# Patient Record
(demographics unavailable — no encounter records)

---

## 2025-01-16 NOTE — HISTORY OF PRESENT ILLNESS
[de-identified] : 73 yo cough x 2 months Flew w/ URI and now w/HL bilat wears H Aids no other modifying factors no other nasal or throat complaints

## 2025-01-16 NOTE — ASSESSMENT
[FreeTextEntry1] : Mixed HL DNS and Rhinitis Rx   Steam humidification and hypertonic saline nasal irrigations  Rx- Flonase tx and medrol dospak f/u 7-10 days poss BMT

## 2025-01-16 NOTE — PROCEDURE
[de-identified] : Lam [de-identified] :  Anterior Rhinoscopy insufficient to account for symptoms.  After informed verbal consent is obtained, the fiberoptic nasal endoscope #3 is passed via the right nasal cavity. The following anatomic sites were directly examined in a sequential fashion: The scope was introduced in both  nasal passages between the middle and inferior turbinates to exam the inferior portion of the middle meatus and the fontanelle, as well as the maxillary ostia.  Next, the scope was passed medially and posteriorly to the middle turbinates to examine the sphenoethmoid recess and the superior turbinate region.   There is [ 0 ]% obstruction of the nasopharynx with adenoid tissue.  A deviated nasal septum was noted causing obstruction. The turbinates were congested-bilateral.  Right Side: 	Mucosa: wnl 	Mucous: none 	Polyp: none 	Inferior Turbinate: sl congested 	Middle Turbinate:sl congested 	Superior Turbinate: wnl 	Inferior Meatus:clear 	Middle Meatus: clear 	Super Meatus: clear 	Sphenoethmoidal Recess: wnl    Left Side: 	Mucosa: wnl 	Mucous:none 	Polyp: none 	Inferior Turbinate: sl congested 	Middle Turbinate: sl congested 	Superior Turbinate:wnl 	Inferior Meatus: clear 	Middle Meatus: clear 	Super Meatus:clear 	Sphenoethmoidal Recess: wnl

## 2025-01-16 NOTE — DATA REVIEWED
[de-identified] : Hearing Test performed to evaluate the extent of hearing loss and  to explain pt's symptoms  was personally reviewed and revealed Tymps-abn Hearing-bilat SNHL w/left CHL

## 2025-01-16 NOTE — PHYSICAL EXAM
[de-identified] : left wax [Nasal Endoscopy Performed] : nasal endoscopy was performed, see procedure section for findings [] : septum deviated to the left [de-identified] : congested [Midline] : trachea located in midline position [Normal] : no rashes

## 2025-01-23 NOTE — ASSESSMENT
[FreeTextEntry1] : Mixed HL-resolved Now w/ SNHL bilateral sensorineural hearing loss-cleared for hearing aids  DNS and Rhinitis Rx   Steam humidification and hypertonic saline nasal irrigations   f/u annual

## 2025-01-23 NOTE — PHYSICAL EXAM
[Nasal Endoscopy Performed] : nasal endoscopy was performed, see procedure section for findings [] : septum deviated to the left [Midline] : trachea located in midline position [Normal] : no rashes [de-identified] : congested

## 2025-01-23 NOTE — HISTORY OF PRESENT ILLNESS
[de-identified] : 71 yo cough x 2 months Flew w/ URI and now w/HL bilat wears H Aids no other modifying factors no other nasal or throat complaints [FreeTextEntry1] : 1/23/2025 feeling better after streroid tx ears feel fine no other modifying factors no other nasal or throat complaints

## 2025-01-23 NOTE — DATA REVIEWED
[de-identified] : Hearing Test performed to evaluate the extent of hearing loss and  to explain pt's symptoms  was personally reviewed and revealed Tymps-abn Hearing-bilat SNHL

## 2025-03-25 NOTE — ASSESSMENT
[FreeTextEntry1] : I reviewed the cardiac imaging, medical records and reports with patient and discussed the case.  I discussed the risks. benefits and alternatives to surgery. Risks included but not limited to  bleeding , stroke, Myocardial Infarction, kidney problems, Blood transfusion, permanent pacemaker implantation, infections and death. I also discussed the various approaches in detail. I feel that the patient will benefit and is a candidate for Mitral valve repair/replacement, tricuspid valve repair/replacement, MAZE and LAAL.   Given his ZACHARY today with soft BP, will admit today for rate control, EP consult and workup for surgery.

## 2025-03-25 NOTE — HISTORY OF PRESENT ILLNESS
[FreeTextEntry1] : Mr. Kennedy is a 72 year old male followed by cardiologist Dr. Alex Briceno and referred by Dr. Andujar for evaluation of his MV disease.  To review, PMH includes hypothyroid, afib, HF and prostrate CA.   Pre review of chart notes, He has been fopllowed by Dr. Briceno with known murmur for 15 years. When last seen by Dr. Briceno 3/14/25he reported NEVAREZ for the past month. He was also noted to be in new onset AF at that time, started on AC.  TTE5/13/2024 EF 50-55%Sev dilated LA, MVP with mod-sev   Presents today.  Felt flutters that started last month.  Notes his HR to be elevated at home. 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  More SOB walking uphill.  Getting DCCV workup this week.  TTE today reviewed with Dr. Hidalgo  Medications:   Toprol XL 25mg QD Eliquis 5mg BID Synthroid 175 mcg QD

## 2025-03-25 NOTE — END OF VISIT
[FreeTextEntry3] : Written by Guerrero Schreiber NP, acting as a scribe for Dr. Rocha The documentation recorded by the scribe accurately reflects the service I personally performed and the decisions made by me. Signature Erik Rocha MD.

## 2025-03-25 NOTE — PHYSICAL EXAM
[General Appearance - Alert] : alert [General Appearance - In No Acute Distress] : in no acute distress [Sclera] : the sclera and conjunctiva were normal [Neck Cervical Mass (___cm)] : no neck mass was observed [] : no respiratory distress [Respiration, Rhythm And Depth] : normal respiratory rhythm and effort [Exaggerated Use Of Accessory Muscles For Inspiration] : no accessory muscle use [Auscultation Breath Sounds / Voice Sounds] : lungs were clear to auscultation bilaterally [Apical Impulse] : the apical impulse was normal [Heart Sounds] : normal S1 and S2 [Abdomen Soft] : soft [Abdomen Tenderness] : non-tender [Involuntary Movements] : no involuntary movements were seen [No Focal Deficits] : no focal deficits [Oriented To Time, Place, And Person] : oriented to person, place, and time [Impaired Insight] : insight and judgment were intact [Affect] : the affect was normal [Mood] : the mood was normal [FreeTextEntry1] : irregular

## 2025-04-18 NOTE — PLAN
[TextEntry] : 1. daily weights and showers 2. cont current meds 3. keep legs elevated 4. incentive spirometry 5. homecare- [facility] 6. diet- low salt, low fat 7. f/u appts - [facility] 4/29, cards tbd 8. [facility] team will continue to f/u with pt status, pt agrees to call with any questions, issues or concerns.

## 2025-04-18 NOTE — PHYSICAL EXAM
[] : no respiratory distress [Respiration, Rhythm And Depth] : normal respiratory rhythm and effort [Auscultation Breath Sounds / Voice Sounds] : lungs were clear to auscultation bilaterally [Heart Rate And Rhythm] : heart rate was normal and rhythm regular [Heart Sounds] : normal S1 and S2 [FreeTextEntry1] : MSI, CT sites  without erythema, drainage or warmth, with edges well approximated.  Sternum stable. BLE minimal edema [Bowel Sounds] : normal bowel sounds [Abdomen Soft] : soft [Abdomen Tenderness] : non-tender

## 2025-04-18 NOTE — ASSESSMENT
[FreeTextEntry1] : 72M s/p MVr/TVr/MAZE and BETSY ligation with Dr. Rocha 3/28/25, course complicated by post cardiotomy shock now s/p impella.

## 2025-04-18 NOTE — HISTORY OF PRESENT ILLNESS
[FreeTextEntry1] : This is a 71 y/o male with history of a murmur, hypothyroidism and prostate cancer, no prior known history of heart failure, who was recently diagnosed with afib, severe MR/TR, dilated NICM and HFrEF, now s/p MVr/TVr/MAZE and BETSY ligation with Dr. Rocha 3/28/25, course complicated by post cardiotomy shock now s/p impella. CM likely longstanding given degree of LV dilation with decompensation in the setting of afib with RVR. He is s/p bilateral chest tube placement 4/3/25 for pleural effusions, which have since improved on CXR. He tolerated impella wean with low filling pressures and preserved output and is now s/p Impella removal on 4/8/25. Post OR he's on low dose epinephrine, which is being weaned down. He has normal filling pressures on swan post impella removal, LE swelling has improved. Underlying junctional rhythm noted with HR 50-60s, BP low-normotensive. He appears near euvolemic on exam and AURY is resolving and s/p guerin placement for failed TOV.  4/11 De-intensified: Tx to SDU, + PW, Acc Gil 62, EP following c/w modified amio 200BID coum tonight 3mg per CTU, +Guerin. 4/12 Mg supplemented; c/w heparin-coumadin bridge; [id number] added and amio d/c'ed per d/w Dr. Rocha. Guerin out, TOV started 4/13 VSS, Failed TOV guerin reinserted yesterday evening, started on Flomax qHS. Patient has hx of enlarged prostate followed by Urologist Dr. Odell Stewart as outpatient. Lasix increased to 40 BID for B/L pleural effusions. Pt OOB to chair, ambulating. INR 1.20, Coumadin 5mg tonight. Continue Heparin bridge. 4/14 hEPARIN /COUMADIN.. lASIX INCREASED TO BID YESTERDAY 4/15 U/S done by [facility] PA, no significant pleural effusions. Still junctional. Wires intact. Coumadin dosing. Heparin gtt adjusted. Ptt 6pm. Home tomorrow w MCOT and likely w/ guerin / leg bag. Has out pt urologist 4/16 VSS. PW cut. MCOT placed. Stable for discharge home. INR 1.49  pt recovering well at home with wife  education and emotional support provided all questions answered INR yesterday 1.56

## 2025-04-29 NOTE — ASSESSMENT
[FreeTextEntry1] : This is a 71 y/o male with history of a murmur, hypothyroidism and prostate cancer, no prior known history of heart failure, who was recently diagnosed with afib, severe MR/TR, dilated NICM and HFrEF, now s/p MVr/TVr/MAZE and BETSY ligation with Dr. Rocha 3/28/25, course complicated by post cardiotomy shock now s/p impella.   CM likely longstanding given degree of LV dilation with decompensation in the setting of afib with RVR. He is s/p bilateral chest tube placement 4/3/25 for pleural effusions, He tolerated impella wean with low filling pressures and preserved output and is now s/p Impella removal on 4/8/25. Post OR he's on low dose epinephrine, which was weaned down. He has normal filling pressures on swan post impella removal, LE swelling has improved. Underlying junctional rhythm noted with HR 50-60s, BP low-normotensive. AURY is resolving and s/p guerin placement for failed TOV. + PW, Acc Gil 62, EP following c/w modified amio 200BID coum tonight 3mg per CTU, started on Flomax qHS. Patient has hx of enlarged prostate followed by Urologist Dr. Odell Stewatr as outpatient. Lasix increased to 40 BID for B/L pleural effusions. Pt OOB to chair, ambulating. INR 1.20, Coumadin 5mg tonight. Continue Heparin bridge. DC home with MCOT and likely w/ guerin / leg bag. Has out pt urologist.  Warfarin INR MGMT with PCP Dr. Sterling Carlton.  Presents today with his wife.  Saw Dr. Hawk last week with cystoscopy, removal of guerin and then reinsertion of guerin.  Flomax was doubled and he was started on Finasteride.  HAs follow up 5/21.  Dr. Hawk discussed possible prostrate surgery when off AC for valves.  Now on Lasix 20mg daily, making good urine and weight is down. Saw PCP Dr. Carlton with INR 2.6 4/28. Followed by Dr. Briceno for cards.  Overall he reports doing and feeling well, "so much better since discharge".  Denies CP, SOB, swelling, weight gain, palpitaions, fever or chills.  Has not felt any afib since surgery.  MCOT in place, junctional today.  Made pt appointment with Dr. Jose D Camacho of EP for Thursday at 11:45AM.  He will follow up with HF as well within the week as I have ensured HF will reach out to the pt for appointment.  WIll needs follow up with repeat TTE.    Today on exam patient's lungs clear bilaterally, normal sinus rhythm, sternum stable, incision clean, dry and intact. No peripheral edema noted. Instructed patient on importance of optimal glycemic control, daily showering, daily weights, incentive spirometer use, and increase ambulation as tolerated. Instructed to call office with any signs or symptoms of infection or weight gain of 2 or more pounds in 1 day or 3 or more pounds in 1 week.   Plan:  - Continue current medication regimen - Follow up with EP Dr. Jose D Camacho Thursday at 11:45AM - Follow up with Heart Failure May 8 at 9:30AM as scheduled (will need repeat TTE) - Follow up with Dr. Briceno  - Follow up with PCP Dr. Carlton for INR/Warfarin MGMT as scheduled - Warfarin for 3 months from MVr and TVr perspective.  Will defer AC at that time to cardiology and if needed can switch back to Eliquis 3 months post op.  - Continue to walk and increase as tolerated - Low salt diet and fluids discussed in detail - Tight glucose control discussed in detail for wound healing - SBE antibiotic prophylaxis discussed at length  - Return to office in one month - Call with any questions or concerns

## 2025-04-29 NOTE — ASSESSMENT
[FreeTextEntry1] : This is a 73 y/o male with history of a murmur, hypothyroidism and prostate cancer, no prior known history of heart failure, who was recently diagnosed with afib, severe MR/TR, dilated NICM and HFrEF, now s/p MVr/TVr/MAZE and BETSY ligation with Dr. Rocha 3/28/25, course complicated by post cardiotomy shock now s/p impella.   CM likely longstanding given degree of LV dilation with decompensation in the setting of afib with RVR. He is s/p bilateral chest tube placement 4/3/25 for pleural effusions, He tolerated impella wean with low filling pressures and preserved output and is now s/p Impella removal on 4/8/25. Post OR he's on low dose epinephrine, which was weaned down. He has normal filling pressures on swan post impella removal, LE swelling has improved. Underlying junctional rhythm noted with HR 50-60s, BP low-normotensive. AURY is resolving and s/p guerin placement for failed TOV. + PW, Acc Gil 62, EP following c/w modified amio 200BID coum tonight 3mg per CTU, started on Flomax qHS. Patient has hx of enlarged prostate followed by Urologist Dr. Odell Stewart as outpatient. Lasix increased to 40 BID for B/L pleural effusions. Pt OOB to chair, ambulating. INR 1.20, Coumadin 5mg tonight. Continue Heparin bridge. DC home with MCOT and likely w/ guerin / leg bag. Has out pt urologist.  Warfarin INR MGMT with PCP Dr. Sterling Carlton.  Presents today with his wife.  Saw Dr. Hawk last week with cystoscopy, removal of guerin and then reinsertion of guerin.  Flomax was doubled and he was started on Finasteride.  HAs follow up 5/21.  Dr. Hawk discussed possible prostrate surgery when off AC for valves.  Now on Lasix 20mg daily, making good urine and weight is down. Saw PCP Dr. Calrton with INR 2.6 4/28. Followed by Dr. Briceno for cards.  Overall he reports doing and feeling well, "so much better since discharge".  Denies CP, SOB, swelling, weight gain, palpitaions, fever or chills.  Has not felt any afib since surgery.  MCOT in place, junctional today.  Made pt appointment with Dr. Jose D Camacho of EP for Thursday at 11:45AM.  He will follow up with HF as well within the week as I have ensured HF will reach out to the pt for appointment.  WIll needs follow up with repeat TTE.    Today on exam patient's lungs clear bilaterally, normal sinus rhythm, sternum stable, incision clean, dry and intact. No peripheral edema noted. Instructed patient on importance of optimal glycemic control, daily showering, daily weights, incentive spirometer use, and increase ambulation as tolerated. Instructed to call office with any signs or symptoms of infection or weight gain of 2 or more pounds in 1 day or 3 or more pounds in 1 week.   Plan:  - Continue current medication regimen - Follow up with EP Dr. Jose D Camacho Thursday at 11:45AM - Follow up with Heart Failure May 8 at 9:30AM as scheduled (will need repeat TTE) - Follow up with Dr. Briceno  - Follow up with PCP Dr. Carlton for INR/Warfarin MGMT as scheduled - Warfarin for 3 months from MVr and TVr perspective.  Will defer AC at that time to cardiology and if needed can switch back to Eliquis 3 months post op.  - Continue to walk and increase as tolerated - Low salt diet and fluids discussed in detail - Tight glucose control discussed in detail for wound healing - SBE antibiotic prophylaxis discussed at length  - Return to office in one month - Call with any questions or concerns

## 2025-05-08 NOTE — PHYSICAL EXAM
[Well Developed] : well developed [Well Nourished] : well nourished [Normal Conjunctiva] : normal conjunctiva [Normal S1, S2] : normal S1, S2 [No Murmur] : no murmur [Clear Lung Fields] : clear lung fields [Soft] : abdomen soft [Non Tender] : non-tender [Normal Gait] : normal gait [No Edema] : no edema [No Rash] : no rash [Moves all extremities] : moves all extremities [No Focal Deficits] : no focal deficits [Alert and Oriented] : alert and oriented [de-identified] : JVP 8 cm H20

## 2025-05-08 NOTE — ASSESSMENT
[FreeTextEntry1] : Mr Kennedy is a 72y M, dilated NICM/HFrEF (LVEF 25-30%, LVIDd 6.5cm), with PMH of afib, severe MR/TR (s/p MVr/TVr/MAZE and BETSY ligation with Dr. Rocha 3/28/25), hypothyroidism, hx of prostate Ca, here for hospital follow up for cardiomyopathy.   He is ACC/AHA Stage C, NYHA Class II, feeling well overall, euvolemic, normotensive with room to optimize GDMTs.   #HFrEF -Etiology: NICM -GDMT: c/w spironolactone 25mg QD, jardiance 10mg QD. Given he had borderline SBP during hospitalization with low 90s, but reassuringly clinic and home SBP has been low 100s, will start losartan 25mg QD - with plan to eventually transition to ARNi once he tolerates ARB. He will need repeat labs prior to starting ARBs -Hold off BB at this time i/s/o of junctional rhythm (50s). He will need to f/u with his EP Dr. Camacho -Diuretics: c/w Lasix 20mg qD for maintenance of euvolemia. Take additional 20mg for acute weight gain. -Device: none, but will need repeat TTE once on max GDMTs -4/16 Labs: K4.2, BUN/Cr 28//1.14, proBNP 2035 (3/25/25). -repeat labs today -Avoid diet with high salt. Continue to log daily sbp and weights -offered cardiac rehab referral - but patient states he has no interest and rather does his own exercise and hopes to go back to swimming  #MR  - s/p MVr/TVr/MAZE/BETSY ligation 3/28.  -on ASA - GDMT and diuretics as above  # Afib.  - s/p MAZE/BETSY ligation - Continue AC (warfarin) and INR managed by PCP - has junctional rhythm (50s HR) during hospitalization - hold off BB for now  #Hypothyroidism -on synthroid 150mcg -f/u with PCP   Will have telehealth in 3 weeks for GDMT optimization and see Dr. Tran next available.

## 2025-05-08 NOTE — HISTORY OF PRESENT ILLNESS
[FreeTextEntry1] : Mr Kennedy is a 72y M, dilated NICM/HFrEF (LVEF 25-30%, LVIDd 6.5cm), with PMH of afib, severe MR/TR (s/p MVr/TVr/MAZE and BETSY ligation with Dr. Rocha 3/28/25), hypothyroidism, prostate Ca, here for hospital follow up for cardiomyopathy.   Hospitalization 3/25/25 to 4/16/25  He has a longstanding history of a murmur, hypothyroidism and prostate cancer, no prior known history of heart failure. He presented from the [facility] office in rapid AFib of 150s and has been having increase NEVAREZ, admitted for structural eval for his severe MR/TR. He was recently diagnosed with afib (started on eliquis) 3-4 weeks prior. He is now s/p MVr/TVr/MAZE and BETSY ligation with Dr. Rocha 3/28, c/b by post cardiotomy shock requiring impella. CM likely longstanding given degree of LV dilation with decompensation in the setting of afib with RVR. s/p bilateral chest tube placement 4/3/25 for pleural effusions, which have since improved on CXR. Impella removal on 4/8/25. Post OR required brief low dose epinephrine. Has underlying junctional rhythm noted with HR 50-60s which EP is following s/p MCOT placed. He was discharged on 4/16 Labs: K4.2, BUN/Cr 28//1.14, proBNP 2035 (3/25/25). Discharge weight - 163.5lb standing. Discharge GDMTs: lasix 20mg QD, jardiance 10mg QD, spironolactone 25mg QD.  He states he had a recent visit with his EP Dr. Jose D Camacho last week and his ECG was still in junctional rhythm (no paperwork) and has MCOT monitor on for another week. He is accompanied by his wife today and states that he is feeling well overall. He is able to walk 4-8 blocks (his dog) and climb 15-20 steps (1 flight of stairs) without any significant SOB or NEVAREZ. He periodically checks his SBP and has low 100s, and weight has been stable around 153-154lb (10lb down from his d/c). States he has excellent appetite and is motivated to stay active.   He denies any CP/palpitation, SOB at rest, LH/dizziness, orthopnea, PND or BLE edema. He plans to go [facility] for travel for 1.5 weeks.

## 2025-05-08 NOTE — CARDIOLOGY SUMMARY
[de-identified] : 4/1/25 TTE: with impella 5.5 LVEF 25-30%, LVIDd 6.5cm, RV not well seen, RV normal size with RVSD, TAPSE 0.8, LA severely dilated, RA dilated, mild TR, trace MT, IVC dilated with abnormal inspiratory collapse  3/25/25 TTE: LVEF 45%, walls normal, LVIDd 7.2, mildly enlarged RV with normal RV function, severely dialted atria, severe mosterior MV leaflet prolapse with a possible partial flail, severe MR, moderate functiona TR, est PASP 44, IVC dilated with abnormal inspiratory collapse, patient in afib with RVR HR up to 160s at time of study [de-identified] : 3/27/25 R/LHC: No sig coronary disease, RA 12, RV 44/7, PA 43/18/29, PCWP 29 v to 45, LVedp 21 without gradient upon pullback across AV, Co/CI 3.26/1.64, SVR 1645 dsc, PVR 1.84 Green

## 2025-05-13 NOTE — ASSESSMENT
[FreeTextEntry1] : This is a 71 y/o male with history of a murmur, hypothyroidism and prostate cancer, no prior known history of heart failure, who was recently diagnosed with afib, severe MR/TR, dilated NICM and HFrEF, now s/p MVr/TVr/MAZE and BETSY ligation with Dr. Rocha 3/28/25, course complicated by post cardiotomy shock now s/p impella.  CM likely longstanding given degree of LV dilation with decompensation in the setting of afib with RVR. He is s/p bilateral chest tube placement 4/3/25 for pleural effusions, He tolerated impella wean with low filling pressures and preserved output and is now s/p Impella removal on 4/8/25. Post OR he's on low dose epinephrine, which was weaned down. He has normal filling pressures on swan post impella removal, LE swelling has improved. Underlying junctional rhythm noted with HR 50-60s, BP low-normotensive. AURY is resolving and s/p guerin placement for failed TOV. + PW, Acc Jun 62, EP following c/w modified amio 200BID coum tonight 3mg per CTU, started on Flomax qHS. Patient has hx of enlarged prostate followed by Urologist Dr. Odell Stewart as outpatient. Lasix increased to 40 BID for B/L pleural effusions. Pt OOB to chair, ambulating. INR 1.20, Coumadin 5mg tonight. Continue Heparin bridge. DC home with MCOT and likely w/ guerin / leg bag. Has out pt urologist. Warfarin INR MGMT with PCP Dr. Sterling Carlton.  Seen 4/29 with his wife. Saw Dr. Hawk last week with cystoscopy, removal of guerin and then reinsertion of guerin. Flomax was doubled and he was started on Finasteride. HAs follow up 5/21. Dr. Hawk discussed possible prostrate surgery when off AC for valves. Now on Lasix 20mg daily, making good urine and weight is down. Saw PCP Dr. Carlton with INR 2.6 4/28. Followed by Dr. Briceno for cards.  At that visit he reported doing and feeling well, "so much better since discharge".  MCOT in place, junctional today. Made pt appointment with Dr. Jose D Camacho of EP for Thursday at 11:45AM. He will follow up with HF as well within the week as I have ensured HF will reach out to the pt for appointment. WIll needs follow up with repeat TTE.  He presents today for follow up with TTE. Presents with his wife.  Feeling much better. No CP, SOB or palpitations.  NSR. Eating and drinking with +BM. Denies chest pain, SOB, swelling, weight gain, palpitations, cough, fever or chills. Followed up with HF and Dr. Camacho for EP.    Today on exam patient's lungs clear bilaterally, normal sinus rhythm, sternum stable, incision clean, dry and intact. No peripheral edema noted. Instructed patient on importance of optimal glycemic control, daily showering, daily weights, incentive spirometer use, and increase ambulation as tolerated. Instructed to call office with any signs or symptoms of infection or weight gain of 2 or more pounds in 1 day or 3 or more pounds in 1 week.   Plan:  - Continue current medication regimen per HF - Follow up with cardiologist, EP and HF - TTE reviewed at time of visit - Follow up with PCP  - Continue to walk and increase as tolerated - Low salt diet and fluids discussed in detail - Tight glucose control discussed in detail for wound healing - SBE antibiotic prophylaxis discussed at length  - Return to office in 4 months with repeat TTE - Call with any questions or concerns

## 2025-05-13 NOTE — PHYSICAL EXAM
[] : no respiratory distress [Respiration, Rhythm And Depth] : normal respiratory rhythm and effort [Exaggerated Use Of Accessory Muscles For Inspiration] : no accessory muscle use [Auscultation Breath Sounds / Voice Sounds] : lungs were clear to auscultation bilaterally [Apical Impulse] : the apical impulse was normal [Heart Rate And Rhythm] : heart rate was normal and rhythm regular [Heart Sounds] : normal S1 and S2 [Clean] : clean [No Edema] : no edema

## 2025-06-24 NOTE — HISTORY OF PRESENT ILLNESS
[de-identified] : 71 y/o M, referred by PMD for noted left cerumen impaction.  Pt. notes no pain, no tinnitus, no change in hearing, no vertigo.  No nasal or throat c/o.

## 2025-06-24 NOTE — REASON FOR VISIT
[Subsequent Evaluation] : a subsequent evaluation for [FreeTextEntry2] : Sent by PMD for Cerumen impaction

## 2025-06-24 NOTE — PHYSICAL EXAM
[Midline] : trachea located in midline position [Normal] : no rashes [de-identified] : b/l slimen

## 2025-06-24 NOTE — END OF VISIT
[FreeTextEntry3] : I personally saw and examined WAYNE ALLRED in detail.I have made changes in changes in the body of the note where appropriate. I personally reviewed the HPI, PMH, FH, SH, ROS and medications/allergies. I have spoken to SUZAN Lutz regarding the history and have personally determined the assessment and plan of care and documented this myself.. I performed all procedures and performed the physical exam. I have made changes in the body of the note where appropriate.   Attesting Faculty: See Attending Signature Below

## 2025-06-24 NOTE — CONSULT LETTER
[Dear  ___] : Dear  [unfilled], [Consult Letter:] : I had the pleasure of evaluating your patient, [unfilled]. [Please see my note below.] : Please see my note below. [Consult Closing:] : Thank you very much for allowing me to participate in the care of this patient.  If you have any questions, please do not hesitate to contact me. [Sincerely,] : Sincerely, [FreeTextEntry3] :  Trung Fritz MD

## 2025-07-30 NOTE — PHYSICAL EXAM
[Well Developed] : well developed [Well Nourished] : well nourished [Normal Conjunctiva] : normal conjunctiva [Normal S1, S2] : normal S1, S2 [No Murmur] : no murmur [Clear Lung Fields] : clear lung fields [Soft] : abdomen soft [Non Tender] : non-tender [Normal Gait] : normal gait [No Edema] : no edema [No Rash] : no rash [Moves all extremities] : moves all extremities [No Focal Deficits] : no focal deficits [Alert and Oriented] : alert and oriented [de-identified] : JVP 6 cm H20

## 2025-07-30 NOTE — HISTORY OF PRESENT ILLNESS
[FreeTextEntry1] : Mr Kennedy is a 72y M, dilated NICM/HFrEF (LVEF 25-30%, LVIDd 6.5cm), with PMH of afib, severe MR/TR (s/p MVr/TVr/MAZE and BETSY ligation with Dr. Rocha 3/28/25), hypothyroidism, prostate Ca, here for hospital follow up for cardiomyopathy.   Hospitalization 3/25/25 to 4/16/25  He has a longstanding history of a murmur, hypothyroidism and prostate cancer, no prior known history of heart failure. He presented from the [facility] office in rapid AFib of 150s and has been having increase NEVAREZ, admitted for structural eval for his severe MR/TR. He was recently diagnosed with afib (started on eliquis) 3-4 weeks prior. He is now s/p MVr/TVr/MAZE and BETSY ligation with Dr. Rocha 3/28, c/b by post cardiotomy shock requiring impella. CM likely longstanding given degree of LV dilation with decompensation in the setting of afib with RVR. s/p bilateral chest tube placement 4/3/25 for pleural effusions, which have since improved on CXR. Impella removal on 4/8/25. Post OR required brief low dose epinephrine. Has underlying junctional rhythm noted with HR 50-60s which EP is following s/p MCOT placed. He was discharged on 4/16 Labs: K4.2, BUN/Cr 28//1.14, proBNP 2035 (3/25/25). Discharge weight - 163.5lb standing. Discharge GDMTs: lasix 20mg QD, jardiance 10mg QD, spironolactone 25mg QD.  Presents today for routine follow up looking and feeling well. In May had been initiated on low dose losartan, which he did not tolerate due to low BP.  It has since been restarted and titrated to 25mg daily, which he appears to have tolerated.  SBP at home in the low 100's, he is without dizziness or lightheadedness. He is otherwise doing well, no sob, orthopnea or PND.  He has no chest pain, palpitations or episodes of syncope.  His appetite is good, no abdominal distention or discomfort. Functionally he does not feel any limitations, has been playing golf and walking his dog.   5/8 labs K 4.9 bun 28 scr 0.96

## 2025-07-30 NOTE — PHYSICAL EXAM
[Well Developed] : well developed [Well Nourished] : well nourished [Normal Conjunctiva] : normal conjunctiva [Normal S1, S2] : normal S1, S2 [No Murmur] : no murmur [Clear Lung Fields] : clear lung fields [Soft] : abdomen soft [Non Tender] : non-tender [Normal Gait] : normal gait [No Edema] : no edema [No Rash] : no rash [Moves all extremities] : moves all extremities [No Focal Deficits] : no focal deficits [Alert and Oriented] : alert and oriented [de-identified] : JVP 6 cm H20

## 2025-07-30 NOTE — CARDIOLOGY SUMMARY
[de-identified] : 4/1/25 TTE: with impella 5.5 LVEF 25-30%, LVIDd 6.5cm, RV not well seen, RV normal size with RVSD, TAPSE 0.8, LA severely dilated, RA dilated, mild TR, trace PA, IVC dilated with abnormal inspiratory collapse  3/25/25 TTE: LVEF 45%, walls normal, LVIDd 7.2, mildly enlarged RV with normal RV function, severely dialted atria, severe mosterior MV leaflet prolapse with a possible partial flail, severe MR, moderate functiona TR, est PASP 44, IVC dilated with abnormal inspiratory collapse, patient in afib with RVR HR up to 160s at time of study [de-identified] : 3/27/25 R/LHC: No sig coronary disease, RA 12, RV 44/7, PA 43/18/29, PCWP 29 v to 45, LVedp 21 without gradient upon pullback across AV, Co/CI 3.26/1.64, SVR 1645 dsc, PVR 1.84 Green

## 2025-07-30 NOTE — PHYSICAL EXAM
[Well Developed] : well developed [Well Nourished] : well nourished [Normal Conjunctiva] : normal conjunctiva [Normal S1, S2] : normal S1, S2 [No Murmur] : no murmur [Clear Lung Fields] : clear lung fields [Soft] : abdomen soft [Non Tender] : non-tender [Normal Gait] : normal gait [No Edema] : no edema [No Rash] : no rash [Moves all extremities] : moves all extremities [No Focal Deficits] : no focal deficits [Alert and Oriented] : alert and oriented [de-identified] : JVP 6 cm H20

## 2025-07-30 NOTE — CARDIOLOGY SUMMARY
[de-identified] : 4/1/25 TTE: with impella 5.5 LVEF 25-30%, LVIDd 6.5cm, RV not well seen, RV normal size with RVSD, TAPSE 0.8, LA severely dilated, RA dilated, mild TR, trace DC, IVC dilated with abnormal inspiratory collapse  3/25/25 TTE: LVEF 45%, walls normal, LVIDd 7.2, mildly enlarged RV with normal RV function, severely dialted atria, severe mosterior MV leaflet prolapse with a possible partial flail, severe MR, moderate functiona TR, est PASP 44, IVC dilated with abnormal inspiratory collapse, patient in afib with RVR HR up to 160s at time of study [de-identified] : 3/27/25 R/LHC: No sig coronary disease, RA 12, RV 44/7, PA 43/18/29, PCWP 29 v to 45, LVedp 21 without gradient upon pullback across AV, Co/CI 3.26/1.64, SVR 1645 dsc, PVR 1.84 Green

## 2025-07-30 NOTE — PHYSICAL EXAM
[Well Developed] : well developed [Well Nourished] : well nourished [Normal Conjunctiva] : normal conjunctiva [Normal S1, S2] : normal S1, S2 [No Murmur] : no murmur [Clear Lung Fields] : clear lung fields [Soft] : abdomen soft [Non Tender] : non-tender [Normal Gait] : normal gait [No Edema] : no edema [No Rash] : no rash [Moves all extremities] : moves all extremities [No Focal Deficits] : no focal deficits [Alert and Oriented] : alert and oriented [de-identified] : JVP 6 cm H20

## 2025-07-30 NOTE — CARDIOLOGY SUMMARY
[de-identified] : 4/1/25 TTE: with impella 5.5 LVEF 25-30%, LVIDd 6.5cm, RV not well seen, RV normal size with RVSD, TAPSE 0.8, LA severely dilated, RA dilated, mild TR, trace VT, IVC dilated with abnormal inspiratory collapse  3/25/25 TTE: LVEF 45%, walls normal, LVIDd 7.2, mildly enlarged RV with normal RV function, severely dialted atria, severe mosterior MV leaflet prolapse with a possible partial flail, severe MR, moderate functiona TR, est PASP 44, IVC dilated with abnormal inspiratory collapse, patient in afib with RVR HR up to 160s at time of study [de-identified] : 3/27/25 R/LHC: No sig coronary disease, RA 12, RV 44/7, PA 43/18/29, PCWP 29 v to 45, LVedp 21 without gradient upon pullback across AV, Co/CI 3.26/1.64, SVR 1645 dsc, PVR 1.84 Green

## 2025-07-30 NOTE — ASSESSMENT
[FreeTextEntry1] : Mr Kennedy is a 72y M, dilated NICM/HFrEF (LVEF 25-30%, LVIDd 6.5cm), with PMH of afib, severe MR/TR (s/p MVr/TVr/MAZE and BESTY ligation with Dr. Rocha 3/28/25), hypothyroidism, hx of prostate Ca, here for follow up of cardiomyopathy.   He is ACC/AHA Stage C, NYHA Class II, euvolemic and normotensive.   #HFrEF -Etiology: NICM -GDMT: losartan 25mg qhs spironolactone 25mg QD, jardiance 10mg QD.  - Labs today K 4.6 BUN 23 SCR 0.74  - Will dc losartan and start low dose entresto -Hold off BB at this time i/s/o of junctional rhythm (50s).  f/u with his EP Dr. Camacho -Diuretics:  Lasix 20mg prn for acute weight gain. -Device: none, but will need repeat TTE once on max GDMTs -Avoid diet with high salt. Continue to log daily BP and weights  #MR  - s/p MVr/TVr/MAZE/BETSY ligation 3/28.  -on ASA - GDMT and diuretics as above  # Afib.  - s/p MAZE/BETSY ligation - Continue AC (warfarin) and INR managed by PCP - has junctional rhythm (50s HR) during hospitalization - hold off BB for now  #Hypothyroidism -on synthroid 150mcg -f/u with PCP   RTC with Dr. Tran as scheduled in August, sooner as needed  
[FreeTextEntry1] : Mr Kennedy is a 72y M, dilated NICM/HFrEF (LVEF 25-30%, LVIDd 6.5cm), with PMH of afib, severe MR/TR (s/p MVr/TVr/MAZE and BETSY ligation with Dr. Rocha 3/28/25), hypothyroidism, hx of prostate Ca, here for follow up of cardiomyopathy.   He is ACC/AHA Stage C, NYHA Class II, euvolemic and normotensive.   #HFrEF -Etiology: NICM -GDMT: losartan 25mg qhs spironolactone 25mg QD, jardiance 10mg QD.  - Labs today K 4.6 BUN 23 SCR 0.74  - Will dc losartan and start low dose entresto -Hold off BB at this time i/s/o of junctional rhythm (50s).  f/u with his EP Dr. Camacho -Diuretics:  Lasix 20mg prn for acute weight gain. -Device: none, but will need repeat TTE once on max GDMTs -Avoid diet with high salt. Continue to log daily BP and weights  #MR  - s/p MVr/TVr/MAZE/BETSY ligation 3/28.  -on ASA - GDMT and diuretics as above  # Afib.  - s/p MAZE/BETSY ligation - Continue AC (warfarin) and INR managed by PCP - has junctional rhythm (50s HR) during hospitalization - hold off BB for now  #Hypothyroidism -on synthroid 150mcg -f/u with PCP   RTC with Dr. Tran as scheduled in August, sooner as needed  
36.5

## 2025-07-30 NOTE — CARDIOLOGY SUMMARY
[de-identified] : 4/1/25 TTE: with impella 5.5 LVEF 25-30%, LVIDd 6.5cm, RV not well seen, RV normal size with RVSD, TAPSE 0.8, LA severely dilated, RA dilated, mild TR, trace SC, IVC dilated with abnormal inspiratory collapse  3/25/25 TTE: LVEF 45%, walls normal, LVIDd 7.2, mildly enlarged RV with normal RV function, severely dialted atria, severe mosterior MV leaflet prolapse with a possible partial flail, severe MR, moderate functiona TR, est PASP 44, IVC dilated with abnormal inspiratory collapse, patient in afib with RVR HR up to 160s at time of study [de-identified] : 3/27/25 R/LHC: No sig coronary disease, RA 12, RV 44/7, PA 43/18/29, PCWP 29 v to 45, LVedp 21 without gradient upon pullback across AV, Co/CI 3.26/1.64, SVR 1645 dsc, PVR 1.84 Green